# Patient Record
Sex: MALE | Race: WHITE | NOT HISPANIC OR LATINO | Employment: FULL TIME | URBAN - NONMETROPOLITAN AREA
[De-identification: names, ages, dates, MRNs, and addresses within clinical notes are randomized per-mention and may not be internally consistent; named-entity substitution may affect disease eponyms.]

---

## 2019-04-22 ENCOUNTER — OFFICE VISIT (OUTPATIENT)
Dept: PODIATRY | Facility: CLINIC | Age: 39
End: 2019-04-22

## 2019-04-22 VITALS — WEIGHT: 166.4 LBS | HEIGHT: 67 IN | BODY MASS INDEX: 26.12 KG/M2 | OXYGEN SATURATION: 98 % | HEART RATE: 68 BPM

## 2019-04-22 DIAGNOSIS — M24.573 EQUINUS CONTRACTURE OF ANKLE: ICD-10-CM

## 2019-04-22 DIAGNOSIS — M76.61 ACHILLES TENDINITIS OF RIGHT LOWER EXTREMITY: Primary | ICD-10-CM

## 2019-04-22 DIAGNOSIS — R52 PAIN: ICD-10-CM

## 2019-04-22 PROCEDURE — 99203 OFFICE O/P NEW LOW 30 MIN: CPT | Performed by: PODIATRIST

## 2019-04-22 RX ORDER — DIVALPROEX SODIUM 500 MG/1
TABLET, DELAYED RELEASE ORAL
COMMUNITY
Start: 2019-04-02

## 2019-04-22 RX ORDER — MELOXICAM 15 MG/1
15 TABLET ORAL DAILY
Qty: 30 TABLET | Refills: 0 | Status: SHIPPED | OUTPATIENT
Start: 2019-04-22

## 2019-04-22 NOTE — PROGRESS NOTES
"Aung Lowe  1980  38 y.o. male     Patient presents to clinic today with complaint of right foot pain.     04/22/2019  Chief Complaint   Patient presents with   • Right Foot - Heel Spurs           History of Present Illness    Aung Lowe is a 38 y.o. male who presents for evaluation of right foot pain.  States the pain is primarily located to the backside of his heel.  This is been ongoing since about October of last year.  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  Pain seems to be worse with activity and relatively relieved with rest.  He describes the pain as sharp and pulling in nature.  He has done some light stretching and taking ibuprofen on and off since the onset of pain with some improvement.  He denies any other formal treatments.  He denies any trauma or injuries to the area.      History reviewed. No pertinent past medical history.      History reviewed. No pertinent surgical history.      Family History   Problem Relation Age of Onset   • Cancer Maternal Aunt    • Heart disease Paternal Grandfather          Social History     Socioeconomic History   • Marital status:      Spouse name: Not on file   • Number of children: Not on file   • Years of education: Not on file   • Highest education level: Not on file   Tobacco Use   • Smoking status: Former Smoker   • Smokeless tobacco: Former User   Substance and Sexual Activity   • Alcohol use: No     Frequency: Never   • Drug use: No   • Sexual activity: Defer         Current Outpatient Medications   Medication Sig Dispense Refill   • divalproex (DEPAKOTE) 500 MG DR tablet      • meloxicam (MOBIC) 15 MG tablet Take 1 tablet by mouth Daily. Take once daily. 30 tablet 0     No current facility-administered medications for this visit.          OBJECTIVE    Pulse 68   Ht 170.2 cm (67\")   Wt 75.5 kg (166 lb 6.4 oz)   SpO2 98%   BMI 26.06 kg/m²       Review of Systems   Constitutional: Negative.    HENT: " Negative.    Eyes: Negative.    Respiratory: Negative.    Cardiovascular: Negative.    Gastrointestinal: Negative.    Endocrine: Negative.    Genitourinary: Negative.    Musculoskeletal:        Foot pain   Skin: Negative.    Allergic/Immunologic: Negative.    Neurological: Negative.    Hematological: Negative.    Psychiatric/Behavioral: Negative.          Physical Exam   Constitutional: he appears well-developed and well-nourished.   HEENT: Normocephalic. Atraumatic.  CV: No CP. RRR  Resp: Non-labored respirations.  Psychiatric: he has a normal mood and affect. his behavior is normal.         Lower Extremity Exam:  Vascular: DP/PT pulses palpable 2+.   No edema  Foot warm  Negative Annette  Neuro: Protective sensation intact, b/l.  Light touch sensation intact, b/l  DTRs intact  Integument: No open wounds or lesions.  No erythema, scaling  No ecchymosis  No masses  Musculoskeletal:  B/L LE muscle strength 5/5.   Achilles, TA, TP, Peroneal tendons intact  Mild tenderness on palpation of posterior Achilles insertion, right  Tenderness with deep palpation of retrocalcaneal bursa, right  Mild pain with maximum dorsiflexion of right foot                ASSESSMENT AND PLAN    Zack was seen today for heel spurs.    Diagnoses and all orders for this visit:    Achilles tendinitis of right lower extremity    Pain  -     XR Foot 3+ View Right    Equinus contracture of ankle    Other orders  -     meloxicam (MOBIC) 15 MG tablet; Take 1 tablet by mouth Daily. Take once daily.      -Comprehensive foot and ankle exam performed  -Radiographs ordered and reviewed  -Educated pt on diagnosis, etiology and treatment of insertional achilles tendonitis, equinus contracture  -Continue motion control shoe  -Begin stretching regimen, education materials dispensed.  -Rx Meloxicam 15 mg qday, not to be taken with other NSAIDs  -Recheck 4 weeks, as needed          This document has been electronically signed by Jose Adams DPM on April  22, 2019 12:52 PM     EMR Dragon/Transcription disclaimer:   Much of this encounter note is an electronic transcription/translation of spoken language to printed text. The electronic translation of spoken language may permit erroneous, or at times, nonsensical words or phrases to be inadvertently transcribed; Although I have reviewed the note for such errors, some may still exist.    Jose Adams DPM  4/22/2019  12:52 PM